# Patient Record
Sex: MALE | Race: WHITE | Employment: OTHER | URBAN - METROPOLITAN AREA
[De-identification: names, ages, dates, MRNs, and addresses within clinical notes are randomized per-mention and may not be internally consistent; named-entity substitution may affect disease eponyms.]

---

## 2018-07-22 ENCOUNTER — HOSPITAL ENCOUNTER (INPATIENT)
Facility: CLINIC | Age: 83
LOS: 2 days | Discharge: HOME | DRG: 689 | End: 2018-07-25
Attending: EMERGENCY MEDICINE | Admitting: INTERNAL MEDICINE
Payer: MEDICARE

## 2018-07-22 ENCOUNTER — APPOINTMENT (OUTPATIENT)
Dept: GENERAL RADIOLOGY | Facility: CLINIC | Age: 83
DRG: 689 | End: 2018-07-22
Attending: EMERGENCY MEDICINE
Payer: MEDICARE

## 2018-07-22 DIAGNOSIS — N39.0 URINARY TRACT INFECTION WITH HEMATURIA, SITE UNSPECIFIED: ICD-10-CM

## 2018-07-22 DIAGNOSIS — R31.9 URINARY TRACT INFECTION WITH HEMATURIA, SITE UNSPECIFIED: ICD-10-CM

## 2018-07-22 DIAGNOSIS — E86.0 DEHYDRATION: ICD-10-CM

## 2018-07-22 DIAGNOSIS — I48.0 PAROXYSMAL ATRIAL FIBRILLATION (H): Primary | ICD-10-CM

## 2018-07-22 PROBLEM — M62.81 GENERALIZED MUSCLE WEAKNESS: Status: ACTIVE | Noted: 2018-07-22

## 2018-07-22 PROBLEM — N17.0 ACUTE RENAL FAILURE WITH TUBULAR NECROSIS (H): Status: ACTIVE | Noted: 2018-07-22

## 2018-07-22 PROBLEM — N30.00 ACUTE CYSTITIS WITHOUT HEMATURIA: Status: ACTIVE | Noted: 2018-07-22

## 2018-07-22 PROBLEM — I10 BENIGN ESSENTIAL HYPERTENSION: Status: ACTIVE | Noted: 2018-07-22

## 2018-07-22 LAB
ALBUMIN SERPL-MCNC: 2.6 G/DL (ref 3.4–5)
ALBUMIN UR-MCNC: 100 MG/DL
ALP SERPL-CCNC: 79 U/L (ref 40–150)
ALT SERPL W P-5'-P-CCNC: 15 U/L (ref 0–70)
ANION GAP SERPL CALCULATED.3IONS-SCNC: 11 MMOL/L (ref 3–14)
APPEARANCE UR: ABNORMAL
AST SERPL W P-5'-P-CCNC: 14 U/L (ref 0–45)
BACTERIA #/AREA URNS HPF: ABNORMAL /HPF
BASOPHILS # BLD AUTO: 0 10E9/L (ref 0–0.2)
BASOPHILS NFR BLD AUTO: 0.1 %
BILIRUB SERPL-MCNC: 1.1 MG/DL (ref 0.2–1.3)
BILIRUB UR QL STRIP: NEGATIVE
BUN SERPL-MCNC: 31 MG/DL (ref 7–30)
CALCIUM SERPL-MCNC: 8.8 MG/DL (ref 8.5–10.1)
CHLORIDE SERPL-SCNC: 96 MMOL/L (ref 94–109)
CO2 SERPL-SCNC: 26 MMOL/L (ref 20–32)
COLOR UR AUTO: ABNORMAL
CREAT SERPL-MCNC: 1.53 MG/DL (ref 0.66–1.25)
DIFFERENTIAL METHOD BLD: ABNORMAL
EOSINOPHIL # BLD AUTO: 0 10E9/L (ref 0–0.7)
EOSINOPHIL NFR BLD AUTO: 0 %
ERYTHROCYTE [DISTWIDTH] IN BLOOD BY AUTOMATED COUNT: 13.7 % (ref 10–15)
GFR SERPL CREATININE-BSD FRML MDRD: 43 ML/MIN/1.7M2
GLUCOSE SERPL-MCNC: 221 MG/DL (ref 70–99)
GLUCOSE UR STRIP-MCNC: NEGATIVE MG/DL
HCT VFR BLD AUTO: 40.4 % (ref 40–53)
HGB BLD-MCNC: 13.9 G/DL (ref 13.3–17.7)
HGB UR QL STRIP: ABNORMAL
IMM GRANULOCYTES # BLD: 0.1 10E9/L (ref 0–0.4)
IMM GRANULOCYTES NFR BLD: 0.4 %
INTERPRETATION ECG - MUSE: NORMAL
KETONES UR STRIP-MCNC: 10 MG/DL
LACTATE BLD-SCNC: 1.4 MMOL/L (ref 0.7–2)
LEUKOCYTE ESTERASE UR QL STRIP: ABNORMAL
LYMPHOCYTES # BLD AUTO: 1 10E9/L (ref 0.8–5.3)
LYMPHOCYTES NFR BLD AUTO: 5.1 %
MCH RBC QN AUTO: 31 PG (ref 26.5–33)
MCHC RBC AUTO-ENTMCNC: 34.4 G/DL (ref 31.5–36.5)
MCV RBC AUTO: 90 FL (ref 78–100)
MONOCYTES # BLD AUTO: 1.1 10E9/L (ref 0–1.3)
MONOCYTES NFR BLD AUTO: 5.2 %
MUCOUS THREADS #/AREA URNS LPF: PRESENT /LPF
NEUTROPHILS # BLD AUTO: 18.1 10E9/L (ref 1.6–8.3)
NEUTROPHILS NFR BLD AUTO: 89.2 %
NITRATE UR QL: POSITIVE
NRBC # BLD AUTO: 0 10*3/UL
NRBC BLD AUTO-RTO: 0 /100
PH UR STRIP: 5.5 PH (ref 5–7)
PLATELET # BLD AUTO: 270 10E9/L (ref 150–450)
POTASSIUM SERPL-SCNC: 3.9 MMOL/L (ref 3.4–5.3)
PROT SERPL-MCNC: 7.7 G/DL (ref 6.8–8.8)
RBC # BLD AUTO: 4.49 10E12/L (ref 4.4–5.9)
RBC #/AREA URNS AUTO: 17 /HPF (ref 0–2)
SODIUM SERPL-SCNC: 133 MMOL/L (ref 133–144)
SOURCE: ABNORMAL
SP GR UR STRIP: 1.03 (ref 1–1.03)
TROPONIN I SERPL-MCNC: <0.015 UG/L (ref 0–0.04)
UROBILINOGEN UR STRIP-MCNC: 2 MG/DL (ref 0–2)
WBC # BLD AUTO: 20.3 10E9/L (ref 4–11)
WBC #/AREA URNS AUTO: >182 /HPF (ref 0–5)

## 2018-07-22 PROCEDURE — 99205 OFFICE O/P NEW HI 60 MIN: CPT | Mod: AI | Performed by: INTERNAL MEDICINE

## 2018-07-22 PROCEDURE — 84484 ASSAY OF TROPONIN QUANT: CPT | Performed by: EMERGENCY MEDICINE

## 2018-07-22 PROCEDURE — 87086 URINE CULTURE/COLONY COUNT: CPT | Performed by: EMERGENCY MEDICINE

## 2018-07-22 PROCEDURE — 87040 BLOOD CULTURE FOR BACTERIA: CPT | Performed by: EMERGENCY MEDICINE

## 2018-07-22 PROCEDURE — 99207 ZZC CDG-MDM COMPONENT: MEETS MODERATE - UP CODED: CPT | Performed by: INTERNAL MEDICINE

## 2018-07-22 PROCEDURE — 93005 ELECTROCARDIOGRAM TRACING: CPT

## 2018-07-22 PROCEDURE — 96361 HYDRATE IV INFUSION ADD-ON: CPT

## 2018-07-22 PROCEDURE — 99285 EMERGENCY DEPT VISIT HI MDM: CPT | Mod: 25

## 2018-07-22 PROCEDURE — 71046 X-RAY EXAM CHEST 2 VIEWS: CPT

## 2018-07-22 PROCEDURE — 25000128 H RX IP 250 OP 636: Performed by: EMERGENCY MEDICINE

## 2018-07-22 PROCEDURE — 81001 URINALYSIS AUTO W/SCOPE: CPT | Performed by: EMERGENCY MEDICINE

## 2018-07-22 PROCEDURE — 85025 COMPLETE CBC W/AUTO DIFF WBC: CPT | Performed by: EMERGENCY MEDICINE

## 2018-07-22 PROCEDURE — 85610 PROTHROMBIN TIME: CPT | Performed by: EMERGENCY MEDICINE

## 2018-07-22 PROCEDURE — 96365 THER/PROPH/DIAG IV INF INIT: CPT

## 2018-07-22 PROCEDURE — 80053 COMPREHEN METABOLIC PANEL: CPT | Performed by: EMERGENCY MEDICINE

## 2018-07-22 PROCEDURE — 83605 ASSAY OF LACTIC ACID: CPT | Performed by: EMERGENCY MEDICINE

## 2018-07-22 RX ORDER — SODIUM CHLORIDE 9 MG/ML
1000 INJECTION, SOLUTION INTRAVENOUS CONTINUOUS
Status: DISCONTINUED | OUTPATIENT
Start: 2018-07-22 | End: 2018-07-23 | Stop reason: DRUGHIGH

## 2018-07-22 RX ORDER — PIPERACILLIN SODIUM, TAZOBACTAM SODIUM 3; .375 G/15ML; G/15ML
3.38 INJECTION, POWDER, LYOPHILIZED, FOR SOLUTION INTRAVENOUS ONCE
Status: COMPLETED | OUTPATIENT
Start: 2018-07-22 | End: 2018-07-22

## 2018-07-22 RX ORDER — TAMSULOSIN HYDROCHLORIDE 0.4 MG/1
0.4 CAPSULE ORAL AT BEDTIME
COMMUNITY

## 2018-07-22 RX ORDER — LIDOCAINE 40 MG/G
CREAM TOPICAL DAILY PRN
COMMUNITY

## 2018-07-22 RX ADMIN — SODIUM CHLORIDE 1000 ML: 9 INJECTION, SOLUTION INTRAVENOUS at 19:48

## 2018-07-22 RX ADMIN — PIPERACILLIN SODIUM,TAZOBACTAM SODIUM 3.38 G: 3; .375 INJECTION, POWDER, FOR SOLUTION INTRAVENOUS at 22:53

## 2018-07-22 RX ADMIN — SODIUM CHLORIDE 1000 ML: 9 INJECTION, SOLUTION INTRAVENOUS at 20:46

## 2018-07-22 ASSESSMENT — ENCOUNTER SYMPTOMS
APPETITE CHANGE: 1
WEAKNESS: 1
FATIGUE: 1
FEVER: 0

## 2018-07-22 NOTE — IP AVS SNAPSHOT
"                  MRN:[id number]                      After Visit Summary   7/22/2018    Nitin Brooke    MRN: [id number]           Thank you!     Thank you for choosing [facility] for your care. Our goal is always to provide you with excellent care. Hearing back from our patients is one way we can continue to improve our services. Please take a few minutes to complete the written survey that you may receive in the mail after you visit with us. Thank you!        Patient Information     Date Of Birth          3/13/1927        Designated Caregiver       Most Recent Value    Caregiver    Will someone help with your care after discharge? yes [\"My son.\"]    Name of designated caregiver Mateus Brooke    Phone number of caregiver see contact info    Caregiver address see contact info      About your hospital stay     You were admitted on:  July 23, 2018 You last received care in the:  [address]    You were discharged on:  July 25, 2018        Reason for your hospital stay       You were admitted with dehydration and likely urinary tract infection.  You improved with IV fluids and antibiotics.                  Who to Call     For medical emergencies, please call 911.  For non-urgent questions about your medical care, please call your primary care provider or clinic, [phone]          Attending Provider     Provider Specialty    Daniel Tim MD Emergency Medicine    Cookie Funes MD Internal Medicine       Primary Care Provider Office Phone # Fax #    Shaun Shepherd [phone]      After Care Instructions     Activity       Your activity upon discharge: activity as tolerated            Diet       Follow this diet upon discharge: Orders Placed This Encounter      Combination Diet Low Saturated Fat Na <2400mg Diet, No Caffeine Diet                  Follow-up Appointments     Follow-up and recommended labs and tests        Follow up with PCP for INR check in 2-3 days    You " "are scheduled for an INR check on  at the [facility] at 1:15 p.m.  Please call them at [phone] if you have to cancel or reschedule                  Future tests that were ordered for you     INR                 Pending Results     Date and Time Order Name Status Description    2018 0040 Blood culture Preliminary     2018 0040 Blood culture Preliminary     2018 2222 Blood culture Preliminary     2018 2222 Blood culture Preliminary             Statement of Approval     Ordered          18 0757  I have reviewed and agree with all the recommendations and orders detailed in this document.  EFFECTIVE NOW     Approved and electronically signed by:  Daniel Smith DO             Admission Information     Date & Time Provider Department Dept. Phone    2018 Cookie Funes MD [place] [address] Oncology [phone]      Your Vitals Were     Blood Pressure Pulse Temperature Respirations Height Weight    115/62 (BP Location: Left arm) 77 98.6  F (37  C) (Oral) 16 1.727 m (5' 8\") 82.6 kg (182 lb)    Pulse Oximetry BMI (Body Mass Index)                98% 27.67 kg/m2          MyChart Information     [facility] lets you send messages to your doctor, view your test results, renew your prescriptions, schedule appointments and more. To sign up, go to [email or web address] . Click on \"Log in\" on the left side of the screen, which will take you to the Welcome page. Then click on \"Sign up Now\" on the right side of the page.     You will be asked to enter the access code listed below, as well as some personal information. Please follow the directions to create your username and password.     Your access code is: [email or web address]  Expires: 10/23/2018  7:58 AM     Your access code will  in 90 days. If you need help or a new code, please call your [facility] or [phone].        Care EveryWhere ID     This is your Care EveryWhere ID. This could be used by " other organizations to access your [facility] medical records  [id number]        Equal Access to Services     FRANTZ ESCOBAR : Tonja Flower, jessica trent, david harrismaall noriega, otoniel prajapatilisasoniya grant. So [facility] [phone].    ATENCIÓN: Si habla español, tiene a wallace disposición servicios gratuitos de asistencia lingüística. Llame al [phone].    We comply with applicable federal civil rights laws and [place] laws. We do not discriminate on the basis of race, color, national origin, age, disability, sex, sexual orientation, or gender identity.               Review of your medicines      START taking        Dose / Directions    cefuroxime 250 MG tablet   Commonly known as:  CEFTIN   Used for:  Urinary tract infection with hematuria, site unspecified        Dose:  250 mg   Take 1 tablet (250 mg) by mouth 2 times daily for 5 days   Quantity:  10 tablet   Refills:  0         CONTINUE these medicines which have NOT CHANGED        Dose / Directions    ACETAMINOPHEN PO        Dose:  500 mg   Take 500 mg by mouth daily as needed for pain   Refills:  0       diltiazem 120 MG 24 hr ER beaded capsule   Commonly known as:  TIAZAC        Dose:  120 mg   Take 120 mg by mouth daily   Refills:  0       diphenhydrAMINE-acetaminophen  MG tablet   Commonly known as:  TYLENOL PM        Dose:  1 tablet   Take 1 tablet by mouth At Bedtime   Refills:  0       FLOMAX 0.4 MG capsule   Generic drug:  tamsulosin        Dose:  0.4 mg   Take 0.4 mg by mouth At Bedtime   Refills:  0       lidocaine 4 % Crea cream   Commonly known as:  LMX4        Apply topically daily as needed for mild pain   Refills:  0       METOPROLOL TARTRATE PO        Dose:  75 mg   Take 75 mg by mouth 2 times daily Patient takes 1.5 of 50mg tablet twice daily.   Refills:  0       multivitamin, therapeutic Tabs tablet        Dose:  1 tablet   Take 1 tablet by mouth daily   Refills:  0       * WARFARIN SODIUM PO         Dose:  5 mg   Take 5 mg by mouth twice a week Patient takes 5mg on M/F and 2.5mg on the rest of the days of the week   Refills:  0       * WARFARIN SODIUM PO        Dose:  2.5 mg   Take 2.5 mg by mouth five times a week Patient takes 2.5mg on Tu/Wed/Th/Sa/Sun and 5mg on M/F.   Refills:  0       * Notice:  This list has 2 medication(s) that are the same as other medications prescribed for you. Read the directions carefully, and ask your doctor or other care provider to review them with you.      STOP taking     HYDROCHLOROTHIAZIDE PO                Where to get your medicines      These medications were sent to [facility] [place], [place] - [address]  [address], [place] [address]     Phone:  [phone]     cefuroxime 250 MG tablet                Protect others around you: Learn how to safely use, store and throw away your medicines at www.disposemymeds.org.        ANTIBIOTIC INSTRUCTION     You've Been Prescribed an Antibiotic - Now What?  Your healthcare team thinks that you or your loved one might have an infection. Some infections can be treated with antibiotics, which are powerful, life-saving drugs. Like all medications, antibiotics have side effects and should only be used when necessary. There are some important things you should know about your antibiotic treatment.      Your healthcare team may run tests before you start taking an antibiotic.    Your team may take samples (e.g., from your blood, urine or other areas) to run tests to look for bacteria. These test can be important to determine if you need an antibiotic at all and, if you do, which antibiotic will work best.      Within a few days, your healthcare team might change or even stop your antibiotic.    Your team may start you on an antibiotic while they are working to find out what is making you sick.    Your team might change your antibiotic because test results show that a different antibiotic would be better to  treat your infection.    In some cases, once your team has more information, they learn that you do not need an antibiotic at all. They may find out that you don't have an infection, or that the antibiotic you're taking won't work against your infection. For example, an infection caused by a virus can't be treated with antibiotics. Staying on an antibiotic when you don't need it is more likely to be harmful than helpful.      You may experience side effects from your antibiotic.    Like all medications, antibiotics have side effects. Some of these can be serious.    Let you healthcare team know if you have any known allergies when you are admitted to the hospital.    One significant side effect of nearly all antibiotics is the risk of severe and sometimes deadly diarrhea caused by Clostridium difficile (C. Difficile). This occurs when a person takes antibiotics because some good germs are destroyed. Antibiotic use allows C. diificile to take over, putting patients at high risk for this serious infection.    As a patient or caregiver, it is important to understand your or your loved one's antibiotic treatment. It is especially important for caregivers to speak up when patients can't speak for themselves. Here are some important questions to ask your healthcare team.    What infection is this antibiotic treating and how do you know I have that infection?    What side effects might occur from this antibiotic?    How long will I need to take this antibiotic?    Is it safe to take this antibiotic with other medications or supplements (e.g., vitamins) that I am taking?     Are there any special directions I need to know about taking this antibiotic? For example, should I take it with food?    How will I be monitored to know whether my infection is responding to the antibiotic?    What tests may help to make sure the right antibiotic is prescribed for me?      Information provided by:  www.cdc.gov/getsmart  U.S. Department  of Health and Human Services  Centers for disease Control and Prevention  National Center for Emerging and Zoonotic Infectious Diseases  Division of Healthcare Quality Promotion             Medication List: This is a list of all your medications and when to take them. Check marks below indicate your daily home schedule. Keep this list as a reference.      Medications           Morning Afternoon Evening Bedtime As Needed    ACETAMINOPHEN PO   Take 500 mg by mouth daily as needed for pain                                cefuroxime 250 MG tablet   Commonly known as:  CEFTIN   Take 1 tablet (250 mg) by mouth 2 times daily for 5 days                                diltiazem 120 MG 24 hr ER beaded capsule   Commonly known as:  TIAZAC   Take 120 mg by mouth daily                                diphenhydrAMINE-acetaminophen  MG tablet   Commonly known as:  TYLENOL PM   Take 1 tablet by mouth At Bedtime                                FLOMAX 0.4 MG capsule   Take 0.4 mg by mouth At Bedtime   Last time this was given:  0.4 mg on 7/24/2018  9:28 PM   Generic drug:  tamsulosin                                lidocaine 4 % Crea cream   Commonly known as:  LMX4   Apply topically daily as needed for mild pain                                METOPROLOL TARTRATE PO   Take 75 mg by mouth 2 times daily Patient takes 1.5 of 50mg tablet twice daily.   Last time this was given:  75 mg on 7/25/2018  8:11 AM                                multivitamin, therapeutic Tabs tablet   Take 1 tablet by mouth daily   Last time this was given:  1 tablet on 7/25/2018  8:11 AM                                * WARFARIN SODIUM PO   Take 5 mg by mouth twice a week Patient takes 5mg on M/F and 2.5mg on the rest of the days of the week   Last time this was given:  2.5 mg on 7/24/2018  5:06 PM                                * WARFARIN SODIUM PO   Take 2.5 mg by mouth five times a week Patient takes 2.5mg on Tu/Wed/Th/Sa/Sun and 5mg on M/F.   Last time  this was given:  2.5 mg on 7/24/2018  5:06 PM                                * Notice:  This list has 2 medication(s) that are the same as other medications prescribed for you. Read the directions carefully, and ask your doctor or other care provider to review them with you.

## 2018-07-22 NOTE — IP AVS SNAPSHOT
[address], [address]    [facility] [address]    Phone:  [phone]                                       After Visit Summary   7/22/2018    Nitin Brooke    MRN: [id number]           After Visit Summary Signature Page     I have received my discharge instructions, and my questions have been answered. I have discussed any challenges I see with this plan with the nurse or doctor.    ..........................................................................................................................................  Patient/Patient Representative Signature      ..........................................................................................................................................  Patient Representative Print Name and Relationship to Patient    ..................................................               ................................................  Date                                            Time    ..........................................................................................................................................  Reviewed by Signature/Title    ...................................................              ..............................................  Date                                                            Time

## 2018-07-23 PROBLEM — N17.9 ACUTE RENAL FAILURE (ARF) (H): Status: ACTIVE | Noted: 2018-07-23

## 2018-07-23 LAB
HBA1C MFR BLD: 6.1 % (ref 0–5.6)
INR PPP: 5.2 (ref 0.86–1.14)
INR PPP: 5.91 (ref 0.86–1.14)
LACTATE BLD-SCNC: 1.5 MMOL/L (ref 0.4–1.9)

## 2018-07-23 PROCEDURE — 12000000 ZZH R&B MED SURG/OB

## 2018-07-23 PROCEDURE — A9270 NON-COVERED ITEM OR SERVICE: HCPCS | Mod: GY | Performed by: INTERNAL MEDICINE

## 2018-07-23 PROCEDURE — 85610 PROTHROMBIN TIME: CPT | Performed by: INTERNAL MEDICINE

## 2018-07-23 PROCEDURE — A9270 NON-COVERED ITEM OR SERVICE: HCPCS | Performed by: INTERNAL MEDICINE

## 2018-07-23 PROCEDURE — 99232 SBSQ HOSP IP/OBS MODERATE 35: CPT | Performed by: INTERNAL MEDICINE

## 2018-07-23 PROCEDURE — 87040 BLOOD CULTURE FOR BACTERIA: CPT | Performed by: INTERNAL MEDICINE

## 2018-07-23 PROCEDURE — 25000128 H RX IP 250 OP 636: Performed by: INTERNAL MEDICINE

## 2018-07-23 PROCEDURE — 83605 ASSAY OF LACTIC ACID: CPT | Performed by: INTERNAL MEDICINE

## 2018-07-23 PROCEDURE — 25000128 H RX IP 250 OP 636: Performed by: EMERGENCY MEDICINE

## 2018-07-23 PROCEDURE — 25000125 ZZHC RX 250: Performed by: INTERNAL MEDICINE

## 2018-07-23 PROCEDURE — 83036 HEMOGLOBIN GLYCOSYLATED A1C: CPT | Performed by: INTERNAL MEDICINE

## 2018-07-23 PROCEDURE — 36415 COLL VENOUS BLD VENIPUNCTURE: CPT | Performed by: INTERNAL MEDICINE

## 2018-07-23 PROCEDURE — 25000132 ZZH RX MED GY IP 250 OP 250 PS 637: Mod: GY | Performed by: INTERNAL MEDICINE

## 2018-07-23 RX ORDER — SODIUM CHLORIDE 9 MG/ML
INJECTION, SOLUTION INTRAVENOUS CONTINUOUS
Status: DISCONTINUED | OUTPATIENT
Start: 2018-07-23 | End: 2018-07-24

## 2018-07-23 RX ORDER — FAMOTIDINE 20 MG/1
20 TABLET, FILM COATED ORAL DAILY
Status: DISCONTINUED | OUTPATIENT
Start: 2018-07-23 | End: 2018-07-25 | Stop reason: HOSPADM

## 2018-07-23 RX ORDER — AMOXICILLIN 250 MG
1 CAPSULE ORAL 2 TIMES DAILY PRN
Status: DISCONTINUED | OUTPATIENT
Start: 2018-07-23 | End: 2018-07-25 | Stop reason: HOSPADM

## 2018-07-23 RX ORDER — POLYETHYLENE GLYCOL 3350 17 G/17G
17 POWDER, FOR SOLUTION ORAL DAILY PRN
Status: DISCONTINUED | OUTPATIENT
Start: 2018-07-23 | End: 2018-07-25 | Stop reason: HOSPADM

## 2018-07-23 RX ORDER — CEFTRIAXONE 1 G/1
1 INJECTION, POWDER, FOR SOLUTION INTRAMUSCULAR; INTRAVENOUS EVERY 24 HOURS
Status: DISCONTINUED | OUTPATIENT
Start: 2018-07-23 | End: 2018-07-25 | Stop reason: HOSPADM

## 2018-07-23 RX ORDER — NALOXONE HYDROCHLORIDE 0.4 MG/ML
.1-.4 INJECTION, SOLUTION INTRAMUSCULAR; INTRAVENOUS; SUBCUTANEOUS
Status: DISCONTINUED | OUTPATIENT
Start: 2018-07-23 | End: 2018-07-25 | Stop reason: HOSPADM

## 2018-07-23 RX ORDER — AMOXICILLIN 250 MG
2 CAPSULE ORAL 2 TIMES DAILY PRN
Status: DISCONTINUED | OUTPATIENT
Start: 2018-07-23 | End: 2018-07-25 | Stop reason: HOSPADM

## 2018-07-23 RX ORDER — LIDOCAINE 40 MG/G
CREAM TOPICAL
Status: DISCONTINUED | OUTPATIENT
Start: 2018-07-23 | End: 2018-07-25 | Stop reason: HOSPADM

## 2018-07-23 RX ORDER — DILTIAZEM HYDROCHLORIDE 120 MG/1
120 CAPSULE, EXTENDED RELEASE ORAL DAILY
Status: DISCONTINUED | OUTPATIENT
Start: 2018-07-23 | End: 2018-07-25 | Stop reason: HOSPADM

## 2018-07-23 RX ORDER — ACETAMINOPHEN 325 MG/1
650 TABLET ORAL EVERY 4 HOURS PRN
Status: DISCONTINUED | OUTPATIENT
Start: 2018-07-23 | End: 2018-07-25 | Stop reason: HOSPADM

## 2018-07-23 RX ORDER — FAMOTIDINE 20 MG/1
20 TABLET, FILM COATED ORAL 2 TIMES DAILY
Status: DISCONTINUED | OUTPATIENT
Start: 2018-07-23 | End: 2018-07-23

## 2018-07-23 RX ORDER — ONDANSETRON 2 MG/ML
4 INJECTION INTRAMUSCULAR; INTRAVENOUS EVERY 6 HOURS PRN
Status: DISCONTINUED | OUTPATIENT
Start: 2018-07-23 | End: 2018-07-25 | Stop reason: HOSPADM

## 2018-07-23 RX ORDER — DOCUSATE SODIUM 100 MG/1
100 CAPSULE, LIQUID FILLED ORAL 2 TIMES DAILY
Status: DISCONTINUED | OUTPATIENT
Start: 2018-07-23 | End: 2018-07-25 | Stop reason: HOSPADM

## 2018-07-23 RX ORDER — MULTIVITAMIN,THERAPEUTIC
1 TABLET ORAL DAILY
Status: DISCONTINUED | OUTPATIENT
Start: 2018-07-23 | End: 2018-07-25 | Stop reason: HOSPADM

## 2018-07-23 RX ORDER — TAMSULOSIN HYDROCHLORIDE 0.4 MG/1
0.4 CAPSULE ORAL AT BEDTIME
Status: DISCONTINUED | OUTPATIENT
Start: 2018-07-23 | End: 2018-07-25 | Stop reason: HOSPADM

## 2018-07-23 RX ORDER — ONDANSETRON 4 MG/1
4 TABLET, ORALLY DISINTEGRATING ORAL EVERY 6 HOURS PRN
Status: DISCONTINUED | OUTPATIENT
Start: 2018-07-23 | End: 2018-07-25 | Stop reason: HOSPADM

## 2018-07-23 RX ORDER — HYDROMORPHONE HYDROCHLORIDE 1 MG/ML
0.2 INJECTION, SOLUTION INTRAMUSCULAR; INTRAVENOUS; SUBCUTANEOUS
Status: DISCONTINUED | OUTPATIENT
Start: 2018-07-23 | End: 2018-07-25 | Stop reason: HOSPADM

## 2018-07-23 RX ORDER — HYDROCODONE BITARTRATE AND ACETAMINOPHEN 5; 325 MG/1; MG/1
1-2 TABLET ORAL EVERY 4 HOURS PRN
Status: DISCONTINUED | OUTPATIENT
Start: 2018-07-23 | End: 2018-07-25 | Stop reason: HOSPADM

## 2018-07-23 RX ADMIN — SODIUM CHLORIDE: 9 INJECTION, SOLUTION INTRAVENOUS at 12:27

## 2018-07-23 RX ADMIN — FAMOTIDINE 20 MG: 20 TABLET, FILM COATED ORAL at 08:25

## 2018-07-23 RX ADMIN — TAMSULOSIN HYDROCHLORIDE 0.4 MG: 0.4 CAPSULE ORAL at 21:04

## 2018-07-23 RX ADMIN — TAMSULOSIN HYDROCHLORIDE 0.4 MG: 0.4 CAPSULE ORAL at 01:09

## 2018-07-23 RX ADMIN — METOPROLOL TARTRATE 75 MG: 50 TABLET ORAL at 08:24

## 2018-07-23 RX ADMIN — SODIUM CHLORIDE 1000 ML: 9 INJECTION, SOLUTION INTRAVENOUS at 00:32

## 2018-07-23 RX ADMIN — DILTIAZEM HYDROCHLORIDE 120 MG: 120 CAPSULE, EXTENDED RELEASE ORAL at 08:25

## 2018-07-23 RX ADMIN — THERA TABS 1 TABLET: TAB at 08:25

## 2018-07-23 RX ADMIN — PHYTONADIONE 2 MG: 10 INJECTION, EMULSION INTRAMUSCULAR; INTRAVENOUS; SUBCUTANEOUS at 09:30

## 2018-07-23 RX ADMIN — CEFTRIAXONE SODIUM 1 G: 1 INJECTION, POWDER, FOR SOLUTION INTRAMUSCULAR; INTRAVENOUS at 01:09

## 2018-07-23 RX ADMIN — SODIUM CHLORIDE: 9 INJECTION, SOLUTION INTRAVENOUS at 01:09

## 2018-07-23 RX ADMIN — METOPROLOL TARTRATE 75 MG: 50 TABLET ORAL at 21:01

## 2018-07-23 ASSESSMENT — ACTIVITIES OF DAILY LIVING (ADL)
ADLS_ACUITY_SCORE: 14
ADLS_ACUITY_SCORE: 14
ADLS_ACUITY_SCORE: 20
ADLS_ACUITY_SCORE: 14
ADLS_ACUITY_SCORE: 20

## 2018-07-23 NOTE — ED PROVIDER NOTES
"  History     Chief Complaint:  Generalized weakness    HPI   Nitin Brooke is a 89 year old male with a history of recurrent bladder infections and a-fib who presents with general weakness. The patient states that for the past 2 weeks he has felt weak and fatigued. He states the he feels that he may be dehydrated. The patient denies any falls or fevers. He is on Warfarin.      Allergies:  No known allergies     Medications:    Acetaminophen  Omnicef  Tiazac  Tylenol  Hydrochlorothiazide  Metoprolol  Warfarin     Past Medical History:    Atrial fibrillation    Past Surgical History:    Bilateral hip replacement    Family History:    No past pertinent family history.    Social History:  Patient presents with son  Former smoker  Positive for alcohol use.   Marital Status:       Review of Systems   Constitutional: Positive for appetite change and fatigue. Negative for fever.   Neurological: Positive for weakness.   All other systems reviewed and are negative.      Physical Exam     Patient Vitals for the past 24 hrs:   BP Temp Temp src Heart Rate Resp SpO2 Height Weight   07/22/18 2100 100/74 - - - - 97 % - -   07/22/18 2000 112/62 - - 77 - 96 % - -   07/22/18 1912 108/63 - - - - 97 % - -   07/22/18 1813 98/58 98.3  F (36.8  C) Oral 83 18 96 % 1.727 m (5' 8\") 82.6 kg (182 lb)         Physical Exam    General: Alert and Interactive.   Head: No signs of trauma.   Mouth/Throat: Oropharynx is clear. Extremely dry mucous membranes   Eyes: Conjunctivae are normal. Pupils are equal, round, and reactive to light.   Neck: Normal range of motion. No nuchal rigidity.   CV: Normal rate and irregular rhythm.    Resp: Effort normal and breath sounds normal. No respiratory distress.   GI: Soft. There is no tenderness or guarding.   MSK: Normal range of motion. no edema.   Neuro: The patient is alert and oriented to person, place, and time.  PERRLA, EOMI, strength in upper/lower extremities normal and symmetrical.   Sensation " normal. Speech normal.  GCS eye subscore is 4. GCS verbal subscore is 5. GCS motor subscore is 6.   Skin: Skin is warm and dry. No rash noted.   Psych: normal mood and affect. behavior is normal.       Emergency Department Course   ECG:  Time: 1919  Vent. Rate 84 bpm. WY interval *. QRS duration 82. QT/QTc 370/437. P-R-T axis * 71 66. Atrial fibrillation. Nonspecific ST abnormality. Abnormal ECG. No significant change compared to EKG dated 2/24/2011 Read time: 1925      Imaging:  Radiographic findings were communicated with the patient who voiced understanding of the findings.  X-ray Chest, 2 views:  no active disease  Result per radiology.      Laboratory:  CBC: WBC: 20.3 (H), HGB: 13.9, PLT: 270  CMP: Glucose 221 (H), Bun: 31 (H), Creatinine: 1.53 (H), GFR; 43 (L), Albumin: 2.6 (L) o/w WNL   Troponin: <0.015  Urine Culture: pending  UA with micro: Blood: Moderate, Ketone: 10, Protein albumin: 100, Nitrite: Positive, Leukocyte esterase: Large. RBC: 17 (H). WBC: >182. Bacteria: Many. Mucous: present o/w negative  Lactic acid: 1.4  Blood culture: pending    Interventions:  1930 - NS 1L IV  2046 - NS 1L IV  2253 - Zosyn 3.375 g IV      Emergency Department Course:  Nursing notes and vitals reviewed.  1930: I performed an exam of the patient as documented above. IV inserted and blood drawn.  Labs and imaging ordered    2223: Findings and plan explained to the Patient who consents to admission.     2315: Discussed the patient with Dr. Funes, who will admit the patient to a medical surgery bed for further monitoring, evaluation, and treatment.     Impression & Plan      Medical Decision Making:  Nitin Brooke is a 89 year old male who presents to the ER with weakness as documented above. The patient is extremely dehydrated. He received 2 liters of normal saline and was able to finally give us urine for testing. The patient s white count is significantly elevated with no fever. He does evidence of acute renal failure as  well, but this is likely all pre-renal secondary to dehydration. Patient will require IV antibiotics, further treatment, and close monitoring overnight. Patient is not septic with a normal lactic acid.     Diagnosis:    ICD-10-CM    1. Urinary tract infection with hematuria, site unspecified N39.0     R31.9    2. Dehydration E86.0        Andrei HINOJOSA, am serving as a scribe on 7/22/2018 at 7:28 PM to personally document services performed by Daniel Tim MD based on my observations and the provider's statements to me.       Andrei Schultz  7/22/2018    EMERGENCY DEPARTMENT       Daniel Tim MD  07/23/18 0001

## 2018-07-23 NOTE — ED NOTES
[facility]  ED Nurse Handoff Report    ED Chief complaint: Generalized Weakness (Feeling weak and tired for the past 2 weeks. Hx of recurrent bladder infections and a-fib. Poor appetite ad poor fluid intake. SOB with walking)      ED Diagnosis:   Final diagnoses:   Urinary tract infection with hematuria, site unspecified   Dehydration       Code Status: DNR / DNI    Allergies: No Known Allergies    Activity level - Baseline/Home:  Independent with cane bu reports he is going to start using a walker.    Activity Level - Current:   Stand with Assist d/t feeling weak     Needed?: No    Isolation: No  Infection: Not Applicable  Bariatric?: No    Vital Signs:   Vitals:    07/22/18 2200 07/22/18 2215 07/22/18 2230 07/22/18 2258   BP: 98/73  101/60 99/65   Resp: 29 15 25 (!) 61   Temp:       TempSrc:       SpO2: 97% 97% 97% 97%   Weight:       Height:           Cardiac Rhythm: ,        Pain level:      Is this patient confused?: No   Guy - Suicide Severity Rating Scale Completed?  Yes  If yes, what color did the patient score?  White    Patient Report: Initial Complaint: Pt presents with generalized weakness over last couple of weeks and feeling dizzy when standing.  Focused Assessment: Pt with dry mucous membranes. In a-fib. Very pleasant.   Tests Performed: labs and chest x-ray; lactic 1.4  Abnormal Results: WBC 20.3; UA shows UTI  Treatments provided: 2L NS; zosyn    Family Comments: Son Mateus present and supportive.    OBS brochure/video discussed/provided to patient: No    ED Medications:   Medications   0.9% sodium chloride BOLUS (0 mLs Intravenous Stopped 7/22/18 2217)     Followed by   sodium chloride 0.9% infusion (not administered)   piperacillin-tazobactam (ZOSYN) 3.375 g vial to attach to  mL bag (3.375 g Intravenous New Bag 7/22/18 2253)   0.9% sodium chloride BOLUS (0 mLs Intravenous Stopped 7/22/18 2044)       Drips infusing?:  No    For the majority of the shift this  patient was Green.   Interventions performed were .    Severe Sepsis OR Septic Shock Diagnosis Present: No      ED NURSE PHONE NUMBER: [phone]

## 2018-07-23 NOTE — H&P
[facility]    History and Physical  Hospitalist       Date of Admission:  7/22/2018    Assessment & Plan   Nitin Brooke is a 89 year old male with history of hypertension on hydrochlorothiazide, A. fib  on warfarin admitted to emergency department for increased generalized weakness and was diagnosed with the UTI and acute renal failure.  Active Problems:    Generalized muscle weakness    Acute cystitis without hematuria    Weakness secondary to possible UTI and renal failure    Admit to inpatient, IV fluids started    Patient has prior history of Klebsiella UTI which was sensitive to cephalosporins, will start him on Rocephin, received 1 dose of Zosyn from the emergency department.    Follow urine and blood cultures.    We will get PT/OT evaluate patient and get social work consult.    Repeat labs in a.m.    Acute renal failure with tubular necrosis (H)    Renal failure multifactorial secondary to being on hydrochlorothiazide, reduced oral intake, UTI and possible hypotension    Continue IV hydration with normal saline    BMP in a.m.    Benign essential hypertension    Paroxysmal atrial fibrillation (H)    Continue metoprolol and diltiazem, hold HCTZ    Patient is on warfarin, pharmacy to dose.  BPH    Continue Flomax.    # Pain Assessment:  Current Pain Score 7/22/2018   Pain score (0-10) 0   Pain location -   Pain descriptors -   - Nitin is not experiencing pain . Pain management was discussed and the plan was created in a collaborative fashion.  Nitin's response to the current recommendations: engaged  - Please see the plan for pain management as documented above        DVT Prophylaxis: Warfarin  Code Status: Full Code    Disposition: Expected discharge in 2 days    Cookie Funes MD    Primary Care Physician   Shaun Shepherd    Chief Complaint   Weakness 1 week     History is obtained from the patient    History of Present Illness   Nitin Brooke is a 89 year old male with a history of  BILLY. fib on warfarin, hypertension on hydrochlorothiazide presents to the emergency department with two-week history of increased progressive weakness.  He had left-sided flank pain 10 days ago and he mentions that his urine was dark and then it got cleared.  He did not receive any antibiotics during this time.  Over past 1 week his weakness had progressively gotten worse and that it was difficult for him to be at home alone.  Patient is a poor historian due to his significant difficulty hearing, has bilateral hearing loss.  He felt that he was dehydrated a few days ago and he was trying to hydrate himself, denied any fever chills or abdominal pain.  He had dysuria for a short while but denies any that now.    In the emergency department he was found to have a creatinine of 1.53, his baseline is 1.  The elevated white count.  LFTs and lactate are within normal limits.  His UA was significantly abnormal cultures pending.  Had a normal chest x-ray.    Past Medical History    I have reviewed this patient's medical history and updated it with pertinent information if needed.   Past Medical History:   Diagnosis Date     Atrial fibrillation (H)        Past Surgical History   I have reviewed this patient's surgical history and updated it with pertinent information if needed.  Past Surgical History:   Procedure Laterality Date     ORTHOPEDIC SURGERY  2002, 2008    bilat hip replacement       Prior to Admission Medications   Prior to Admission Medications   Prescriptions Last Dose Informant Patient Reported? Taking?   ACETAMINOPHEN PO Past Month at Unknown time Self Yes Yes   Sig: Take 500 mg by mouth daily as needed for pain    HYDROCHLOROTHIAZIDE PO 7/22/2018 at am Pharmacy Yes Yes   Sig: Take 12.5 mg by mouth daily Patient takes 1/2 of 25mg tablet daily   METOPROLOL TARTRATE PO 7/22/2018 at am Pharmacy Yes Yes   Sig: Take 75 mg by mouth 2 times daily Patient takes 1.5 of 50mg tablet twice daily.   WARFARIN SODIUM PO  7/20/2018 Self Yes Yes   Sig: Take 5 mg by mouth twice a week Patient takes 5mg on M/F and 2.5mg on the rest of the days of the week   WARFARIN SODIUM PO 7/21/2018 at hs Pharmacy Yes Yes   Sig: Take 2.5 mg by mouth five times a week Patient takes 2.5mg on Tu/Wed/Th/Sa/Sun and 5mg on M/F.   diltiazem (TIAZAC) 120 MG CP24 7/22/2018 at am Pharmacy Yes Yes   Sig: Take 120 mg by mouth daily   diphenhydrAMINE-acetaminophen (TYLENOL PM)  MG tablet 7/21/2018 at hs  Yes Yes   Sig: Take 1 tablet by mouth At Bedtime   lidocaine (LMX4) 4 % CREA cream prn  Yes Yes   Sig: Apply topically daily as needed for mild pain   multivitamin, therapeutic (THERA-VIT) TABS 7/22/2018 at am Self Yes Yes   Sig: Take 1 tablet by mouth daily    tamsulosin (FLOMAX) 0.4 MG capsule 7/21/2018 at hs  Yes Yes   Sig: Take 0.4 mg by mouth At Bedtime      Facility-Administered Medications: None     Allergies   No Known Allergies    Social History   I have reviewed this patient's social history and updated it with pertinent information if needed. Nitin Brooke  reports that he has quit smoking. He does not have any smokeless tobacco history on file. He reports that he drinks about 2.0 oz of alcohol per week     Family History   I have reviewed this patient's family history and updated it with pertinent information if needed.   No family history on file.    Review of Systems   The 10 point Review of Systems is negative other than noted in the HPI .    Physical Exam   Temp: 98.3  F (36.8  C) Temp src: Oral BP: 114/63   Heart Rate: 69 Resp: 27 SpO2: 97 % O2 Device: None (Room air)    Vital Signs with Ranges  Temp:  [98.3  F (36.8  C)] 98.3  F (36.8  C)  Heart Rate:  [63-84] 69  Resp:  [15-61] 27  BP: ()/(58-74) 114/63  SpO2:  [96 %-97 %] 97 %  182 lbs 0 oz    Constitutional: Awake, alert, cooperative, no apparent distress.  Eyes: Conjunctiva and pupils examined and normal.  HEENT: Moist mucous membranes, normal dentition.  Respiratory: Clear to  auscultation bilaterally, no crackles or wheezing.  Cardiovascular: Regular rate and rhythm, normal S1 and S2, and no murmur noted.  GI: Soft, non-distended, non-tender, normal bowel sounds.  Lymph/Hematologic: No anterior cervical or supraclavicular adenopathy.  Skin: No rashes, no cyanosis, no edema.  Musculoskeletal: No joint swelling, erythema or tenderness.  Neurologic: Cranial nerves 2-12 intact, normal strength and sensation.  Psychiatric: Alert, oriented to person, place and time, no obvious anxiety or depression.    Data   Data reviewed today:  I personally reviewed chest x ray     Recent Labs  Lab 07/22/18  1948   WBC 20.3*   HGB 13.9   MCV 90         POTASSIUM 3.9   CHLORIDE 96   CO2 26   BUN 31*   CR 1.53*   ANIONGAP 11   OSWALD 8.8   *   ALBUMIN 2.6*   PROTTOTAL 7.7   BILITOTAL 1.1   ALKPHOS 79   ALT 15   AST 14   TROPI <0.015       Imaging:  Recent Results (from the past 24 hour(s))   XR Chest 2 Views    Narrative    CHEST TWO VIEWS  7/22/2018 8:37 PM     HISTORY: Chest pain. Shortness of breath.    COMPARISON: 2/23/2011.      Impression    IMPRESSION: No active disease     JOSEMANUEL BUCHANAN MD

## 2018-07-23 NOTE — PLAN OF CARE
RECEIVING UNIT ED HANDOFF REVIEW    ED Nurse Handoff Report was reviewed by: Cathy Condon on July 22, 2018 at 11:35 PM

## 2018-07-23 NOTE — PHARMACY-ADMISSION MEDICATION HISTORY
Admission medication history interview status for the 7/22/2018  admission is complete. See EPIC admission navigator for prior to admission medications     Medication history source reliability:Good, pt interview    Actions taken by pharmacist (provider contacted, etc):added Flomax, Lidocaine cream     Additional medication history information not noted on PTA med list :None    Medication reconciliation/reorder completed by provider prior to medication history? No    Time spent in this activity: 15 minutes    Prior to Admission medications    Medication Sig Last Dose Taking? Auth Provider   ACETAMINOPHEN PO Take 500 mg by mouth daily as needed for pain  Past Month at Unknown time Yes Unknown, Entered By History   diltiazem (TIAZAC) 120 MG CP24 Take 120 mg by mouth daily 7/22/2018 at am Yes Unknown, Entered By History   diphenhydrAMINE-acetaminophen (TYLENOL PM)  MG tablet Take 1 tablet by mouth At Bedtime 7/21/2018 at hs Yes Unknown, Entered By History   HYDROCHLOROTHIAZIDE PO Take 12.5 mg by mouth daily Patient takes 1/2 of 25mg tablet daily 7/22/2018 at am Yes Unknown, Entered By History   lidocaine (LMX4) 4 % CREA cream Apply topically daily as needed for mild pain prn Yes Unknown, Entered By History   METOPROLOL TARTRATE PO Take 75 mg by mouth 2 times daily Patient takes 1.5 of 50mg tablet twice daily. 7/22/2018 at am Yes Unknown, Entered By History   multivitamin, therapeutic (THERA-VIT) TABS Take 1 tablet by mouth daily  7/22/2018 at am Yes Unknown, Entered By History   tamsulosin (FLOMAX) 0.4 MG capsule Take 0.4 mg by mouth At Bedtime 7/21/2018 at hs Yes Unknown, Entered By History   WARFARIN SODIUM PO Take 5 mg by mouth twice a week Patient takes 5mg on M/F and 2.5mg on the rest of the days of the week 7/20/2018 Yes Unknown, Entered By History   WARFARIN SODIUM PO Take 2.5 mg by mouth five times a week Patient takes 2.5mg on Tu/Wed/Th/Sa/Sun and 5mg on M/F. 7/21/2018 at hs Yes Unknown, Entered By  History

## 2018-07-23 NOTE — PROGRESS NOTES
[facility]    Hospitalist Progress Note    Assessment & Plan   Nitin Brooke is a 89 year old male with history of hypertension on hydrochlorothiazide, A. fib  on warfarin admitted to emergency department for increased generalized weakness and was diagnosed with the UTI and acute renal failure.  Active Problems:    Generalized muscle weakness    Acute cystitis without hematuria    Weakness secondary to possible UTI and renal failure    IV NS 125cc/hr    Patient has prior history of Klebsiella UTI which was sensitive to cephalosporins, will start him on Rocephin, received 1 dose of Zosyn from the emergency department.    Follow urine and blood cultures.     PT/OT evaluate patient and get social work consult.    Acute renal failure with tubular necrosis (H)    Renal failure multifactorial secondary to being on hydrochlorothiazide, reduced oral intake, UTI and possible hypotension    Continue IV hydration with normal saline      Benign essential hypertension    Paroxysmal atrial fibrillation (H)    Continue metoprolol and diltiazem, hold HCTZ    Pharmacy dosing warfarin, INR currently supratherapeutic (>5) will give small dose of oral vitamin K  BPH    Continue Flomax.    DVT Prophylaxis: Warfarin  Code Status: Full Code    Disposition: Expected discharge in 1-2 days.    Kain Lee MD  Text Page (7am to 6pm)    Interval History   H&P reviewed, patient seen.  He feels 'better.'  Denies pain, feels less tired.  No n/v, f/c.    -Data reviewed today: I reviewed all new labs and imaging results over the last 24 hours. I personally reviewed no images or EKG's today.    Physical Exam   Temp: 99.7  F (37.6  C) Temp src: Oral BP: 126/55   Heart Rate: 105 Resp: 16 SpO2: 94 % O2 Device: None (Room air)    Vitals:    07/22/18 1813   Weight: 82.6 kg (182 lb)     Vital Signs with Ranges  Temp:  [98.1  F (36.7  C)-99.7  F (37.6  C)] 99.7  F (37.6  C)  Heart Rate:  [] 105  Resp:  [15-61] 16  BP:  ()/(55-74) 126/55  SpO2:  [94 %-99 %] 94 %  I/O last 3 completed shifts:  In: 2000 [IV Piggyback:2000]  Out: 350 [Urine:350]    Constitutional: Awake, alert, cooperative, no apparent distress  Respiratory: Clear to auscultation bilaterally,   Cardiovascular: irr/irr, s1s2  GI:  soft, non-distended, non-tender  Skin/Integumen: No rashes, no cyanosis, no edema  Other:      Medications     - MEDICATION INSTRUCTIONS -       sodium chloride 75 mL/hr at 07/23/18 0109     Warfarin Therapy Reminder         cefTRIAXone  1 g Intravenous Q24H     diltiazem  120 mg Oral Daily     docusate sodium  100 mg Oral BID     famotidine  20 mg Oral Daily     metoprolol tartrate (LOPRESSOR) tablet 75 mg  75 mg Oral BID     multivitamin, therapeutic  1 tablet Oral Daily     phytonadione  2 mg Oral Once     sodium chloride (PF)  3 mL Intracatheter Q8H     tamsulosin  0.4 mg Oral At Bedtime       Data     Recent Labs  Lab 07/22/18  1948   WBC 20.3*   HGB 13.9   MCV 90      INR 5.20*      POTASSIUM 3.9   CHLORIDE 96   CO2 26   BUN 31*   CR 1.53*   ANIONGAP 11   OSWALD 8.8   *   ALBUMIN 2.6*   PROTTOTAL 7.7   BILITOTAL 1.1   ALKPHOS 79   ALT 15   AST 14   TROPI <0.015       Imaging:   Recent Results (from the past 24 hour(s))   XR Chest 2 Views    Narrative    CHEST TWO VIEWS  7/22/2018 8:37 PM     HISTORY: Chest pain. Shortness of breath.    COMPARISON: 2/23/2011.      Impression    IMPRESSION: No active disease     JOSEMANUEL BUCHANAN MD

## 2018-07-23 NOTE — CONSULTS
Care Transition Initial Assessment - SW  Reason For Consult: discharge planning  Met with: chart review    Active Problems:    Generalized muscle weakness    Acute cystitis without hematuria    Acute renal failure with tubular necrosis (H)    Benign essential hypertension    Paroxysmal atrial fibrillation (H)    Acute renal failure (ARF) (H)       DATA  Lives With: alone  Living Arrangements: apartment  Description of Support System: Supportive, Involved  Who is your support system?: Children  Support Assessment: Adequate family and caregiver support.   Identified issues/concerns regarding health management: SW following for dc needs  Quality Of Family Relationships: supportive, involved     ASSESSMENT  Cognitive Status: Alert and oriented X4  Concerns to be addressed: Patient is a 89 year old male who was admitted to the hospital for generalized muscle weakness. Prior to hospitalization [facility]. Patient will be assessed by PT/OT to determine safe discharge planning needs. SW will continue to follow and assist as needed.     PLAN  Financial costs for the patient includes   Patient given options and choices for discharge   Patient/family is agreeable to the plan?    Patient Goals and Preferences: TBD  Patient anticipates discharging to:  TBD      REDD Porras   *[phone]

## 2018-07-23 NOTE — PLAN OF CARE
Problem: Patient Care Overview  Goal: Plan of Care/Patient Progress Review  PT: Orders received, chart reviewed. Noted critical INR of 5.91. Per discussion with RN, will hold PT evaluation at this time.

## 2018-07-23 NOTE — PHARMACY-ANTICOAGULATION SERVICE
Clinical Pharmacy - Warfarin Dosing Consult     Pharmacy has been consulted to manage this patient s warfarin therapy.  Indication: Atrial Fibrillation  Therapy Goal: INR 2-3  Warfarin Prior to Admission: Yes  Warfarin PTA Regimen:  5mg mondays and friday, 2.5mg rest of the days of the week    INR   Date Value Ref Range Status   07/22/2018 5.20 (HH) 0.86 - 1.14 Final     Comment:     Critical Value called to and read back by  HAZEL GIFFORD IN 88 AT 0047 BY ALK     01/18/2016 2.61 (H) 0.86 - 1.14 Final       Recommend no warfarin tonight.  Pharmacy will monitor Nitin Brooek daily and order warfarin doses to achieve specified goal.    Please contact pharmacy as soon as possible if the warfarin needs to be held for a procedure or if the warfarin goals change.

## 2018-07-23 NOTE — PLAN OF CARE
Problem: Patient Care Overview  Goal: Plan of Care/Patient Progress Review  Outcome: No Change  A&Ox4. Tmax 100.3, other VSS on RA. [place]. Up with A1, walker and gait belt. Urinal at bedside d/t frequent voiding. Calls appropriately. Tolerating heart healthy diet. Lung sounds diminished. IV infusing. Denies pain. Continue to monitor.

## 2018-07-24 ENCOUNTER — APPOINTMENT (OUTPATIENT)
Dept: PHYSICAL THERAPY | Facility: CLINIC | Age: 83
DRG: 689 | End: 2018-07-24
Attending: INTERNAL MEDICINE
Payer: MEDICARE

## 2018-07-24 LAB
ANION GAP SERPL CALCULATED.3IONS-SCNC: 7 MMOL/L (ref 3–14)
BACTERIA SPEC CULT: NORMAL
BUN SERPL-MCNC: 22 MG/DL (ref 7–30)
CALCIUM SERPL-MCNC: 8.3 MG/DL (ref 8.5–10.1)
CHLORIDE SERPL-SCNC: 103 MMOL/L (ref 94–109)
CO2 SERPL-SCNC: 29 MMOL/L (ref 20–32)
CREAT SERPL-MCNC: 1.16 MG/DL (ref 0.66–1.25)
ERYTHROCYTE [DISTWIDTH] IN BLOOD BY AUTOMATED COUNT: 13.9 % (ref 10–15)
GFR SERPL CREATININE-BSD FRML MDRD: 59 ML/MIN/1.7M2
GLUCOSE SERPL-MCNC: 111 MG/DL (ref 70–99)
HCT VFR BLD AUTO: 35.7 % (ref 40–53)
HGB BLD-MCNC: 11.9 G/DL (ref 13.3–17.7)
INR PPP: 1.99 (ref 0.86–1.14)
Lab: NORMAL
MCH RBC QN AUTO: 30.1 PG (ref 26.5–33)
MCHC RBC AUTO-ENTMCNC: 33.3 G/DL (ref 31.5–36.5)
MCV RBC AUTO: 90 FL (ref 78–100)
PLATELET # BLD AUTO: 209 10E9/L (ref 150–450)
POTASSIUM SERPL-SCNC: 3.5 MMOL/L (ref 3.4–5.3)
RBC # BLD AUTO: 3.95 10E12/L (ref 4.4–5.9)
SODIUM SERPL-SCNC: 139 MMOL/L (ref 133–144)
SPECIMEN SOURCE: NORMAL
WBC # BLD AUTO: 11.3 10E9/L (ref 4–11)

## 2018-07-24 PROCEDURE — 40000193 ZZH STATISTIC PT WARD VISIT: Performed by: PHYSICAL THERAPIST

## 2018-07-24 PROCEDURE — 99232 SBSQ HOSP IP/OBS MODERATE 35: CPT | Performed by: INTERNAL MEDICINE

## 2018-07-24 PROCEDURE — 97161 PT EVAL LOW COMPLEX 20 MIN: CPT | Mod: GP | Performed by: PHYSICAL THERAPIST

## 2018-07-24 PROCEDURE — 80048 BASIC METABOLIC PNL TOTAL CA: CPT | Performed by: INTERNAL MEDICINE

## 2018-07-24 PROCEDURE — 36415 COLL VENOUS BLD VENIPUNCTURE: CPT | Performed by: INTERNAL MEDICINE

## 2018-07-24 PROCEDURE — 12000000 ZZH R&B MED SURG/OB

## 2018-07-24 PROCEDURE — A9270 NON-COVERED ITEM OR SERVICE: HCPCS | Mod: GY | Performed by: INTERNAL MEDICINE

## 2018-07-24 PROCEDURE — 25000128 H RX IP 250 OP 636: Performed by: INTERNAL MEDICINE

## 2018-07-24 PROCEDURE — 85610 PROTHROMBIN TIME: CPT | Performed by: INTERNAL MEDICINE

## 2018-07-24 PROCEDURE — 85027 COMPLETE CBC AUTOMATED: CPT | Performed by: INTERNAL MEDICINE

## 2018-07-24 PROCEDURE — 25000132 ZZH RX MED GY IP 250 OP 250 PS 637: Mod: GY | Performed by: INTERNAL MEDICINE

## 2018-07-24 RX ORDER — WARFARIN SODIUM 2.5 MG/1
2.5 TABLET ORAL
Status: COMPLETED | OUTPATIENT
Start: 2018-07-24 | End: 2018-07-24

## 2018-07-24 RX ADMIN — DILTIAZEM HYDROCHLORIDE 120 MG: 120 CAPSULE, EXTENDED RELEASE ORAL at 08:14

## 2018-07-24 RX ADMIN — METOPROLOL TARTRATE 75 MG: 50 TABLET ORAL at 21:28

## 2018-07-24 RX ADMIN — CEFTRIAXONE SODIUM 1 G: 1 INJECTION, POWDER, FOR SOLUTION INTRAMUSCULAR; INTRAVENOUS at 00:57

## 2018-07-24 RX ADMIN — METOPROLOL TARTRATE 75 MG: 50 TABLET ORAL at 08:14

## 2018-07-24 RX ADMIN — FAMOTIDINE 20 MG: 20 TABLET, FILM COATED ORAL at 08:14

## 2018-07-24 RX ADMIN — TAMSULOSIN HYDROCHLORIDE 0.4 MG: 0.4 CAPSULE ORAL at 21:28

## 2018-07-24 RX ADMIN — THERA TABS 1 TABLET: TAB at 08:14

## 2018-07-24 RX ADMIN — SODIUM CHLORIDE: 9 INJECTION, SOLUTION INTRAVENOUS at 00:57

## 2018-07-24 RX ADMIN — WARFARIN SODIUM 2.5 MG: 2.5 TABLET ORAL at 17:06

## 2018-07-24 ASSESSMENT — ACTIVITIES OF DAILY LIVING (ADL)
ADLS_ACUITY_SCORE: 13
ADLS_ACUITY_SCORE: 12
ADLS_ACUITY_SCORE: 13
ADLS_ACUITY_SCORE: 12

## 2018-07-24 NOTE — PLAN OF CARE
Problem: Patient Care Overview  Goal: Plan of Care/Patient Progress Review  Discharge Planner OT   Patient plan for discharge: Unknown.  Current status: Order rec'd and chart reviewed. Patient has been evaluated by PT who reports patient is at his baseline. Patient has no reported concerns for ADL.   Barriers to return to prior living situation: Defer to PT.  Recommendations for discharge: Defer to PT.   Rationale for recommendations: OT eval not indicated as patient has returned to baseline. PT reports he had no difficulty with ADL in bathroom. OT order completed.       Entered by: Tammie Muller 07/24/2018 11:03 AM

## 2018-07-24 NOTE — PLAN OF CARE
Problem: Patient Care Overview  Goal: Plan of Care/Patient Progress Review  Outcome: No Change  A&O, VSS on RA, denies pain. Pleasant and cooperative. Ambulating well in room w/SBA to bathroom; utilizing cane for mobility. Urine appearance extremely cloudy/concentrated (looks like orange juice). Tolerating cardiac/no caffeine diet. PT/OT following. IVF infusing.

## 2018-07-24 NOTE — PLAN OF CARE
Problem: Patient Care Overview  Goal: Plan of Care/Patient Progress Review  Outcome: No Change  A&Ox4. VSS on RA. [place]. Up with SB, cane and gait belt to bathroom. Calls appropriately. Tolerating heart healthy diet, no caffeine. Lung sounds diminished. IV SL. Denies pain. INR improved. Plan for discharge home tomorrow. Continue to monitor.

## 2018-07-24 NOTE — PLAN OF CARE
Problem: Patient Care Overview  Goal: Plan of Care/Patient Progress Review  Outcome: No Change  VSS, LS clear.  A/o, up Ax1.  Voids frequently, adequate amts.  Denies pain.

## 2018-07-24 NOTE — PROGRESS NOTES
07/24/18 0907   Quick Adds   Type of Visit Initial PT Evaluation   Living Environment   Lives With alone   Living Arrangements apartment;independent living facility   Home Accessibility no concerns   Number of Stairs to Enter Home 0   Number of Stairs Within Home 0   Transportation Available car;family or friend will provide  (patient drives at baseline)   Living Environment Comment no stairs; able to navigate independently   Self-Care   Usual Activity Tolerance excellent   Current Activity Tolerance good   Regular Exercise yes   Activity/Exercise Type walking   Exercise Amount/Frequency 3-5 times/wk   Equipment Currently Used at Home cane, straight  (Hurry cane)   Activity/Exercise/Self-Care Comment patient normally independent and active with a cane   Functional Level Prior   Ambulation 1-->assistive equipment  (cane)   Transferring 1-->assistive equipment  (cane)   Toileting 0-->independent   Bathing 0-->independent   Dressing 0-->independent   Eating 0-->independent   Communication 0-->understands/communicates without difficulty   Swallowing 0-->swallows foods/liquids without difficulty   Cognition 0 - no cognition issues reported   Fall history within last six months no   Which of the above functional risks had a recent onset or change? ambulation  (weakness)   Prior Functional Level Comment patient independent and active at baseline   General Information   Onset of Illness/Injury or Date of Surgery - Date 07/22/18   Referring Physician Cookie Funes MD   Patient/Family Goals Statement return home   Pertinent History of Current Problem (include personal factors and/or comorbidities that impact the POC) Nitin Brooke is a 89 year old male with history of hypertension on hydrochlorothiazide, A. fib  on warfarin admitted to emergency department for increased generalized weakness and was diagnosed with the UTI and acute renal failure.   Precautions/Limitations fall precautions   General Observations patient  "needing to use bathroom upon arrival; [facility]   General Info Comments Activity: up with assist   Cognitive Status Examination   Orientation orientation to person, place and time   Level of Consciousness alert   Follows Commands and Answers Questions 100% of the time   Personal Safety and Judgment intact   Memory intact   Pain Assessment   Patient Currently in Pain Yes, see Vital Sign flowsheet   Posture    Posture Comments WFL   Range of Motion (ROM)   ROM Comment WFL   Strength   Strength Comments WFL   Bed Mobility   Bed Mobility Comments independent   Transfer Skills   Transfer Comments sit<>stand and toilet transfer with modified independent; no LOB   Gait   Gait Comments able to ambulate to bathroom and > 150 feet with cane and modified independence; no LOB   Balance   Balance Comments intact; use of cane   Sensory Examination   Sensory Perception Comments intact   General Therapy Interventions   Planned Therapy Interventions other (see comments)  (encourage walking program with nursing and prior HEP)   Clinical Impression   Criteria for Skilled Therapeutic Intervention no;evaluation only;current level of function same as previous level of function   Therapy Frequency` other (see comments)  (1x eval)   Predicted Duration of Therapy Intervention (days/wks) 1x eval only   Anticipated Equipment Needs at Discharge standard cane  (Hurry cane)   Anticipated Discharge Disposition Home;Home with Assist   Risk & Benefits of therapy have been explained Yes   Patient, Family & other staff in agreement with plan of care Yes   [facility] [employer] TM \"6 Clicks\"   2016, Trustees of [facility], under license to [employer].  All rights reserved.   6 Clicks Short Forms Basic Mobility Inpatient Short Form   [facility] AM-PAC  \"6 Clicks\" V.2 Basic Mobility Inpatient Short Form   1. Turning from your back to your side while in a flat bed without using bedrails? 3 - A Little   2. Moving from lying on your back to " sitting on the side of a flat bed without using bedrails? 4 - None   3. Moving to and from a bed to a chair (including a wheelchair)? 4 - None   4. Standing up from a chair using your arms (e.g., wheelchair, or bedside chair)? 4 - None   5. To walk in hospital room? 4 - None   6. Climbing 3-5 steps with a railing? 4 - None   Basic Mobility Raw Score (Score out of 24.Lower scores equate to lower levels of function) 23   Total Evaluation Time   Total Evaluation Time (Minutes) 15

## 2018-07-24 NOTE — PROGRESS NOTES
[facility]    Hospitalist Progress Note    Assessment & Plan   Nitin Brooke is a 89 year old male with history of hypertension on hydrochlorothiazide, A. fib  on warfarin admitted to emergency department for increased generalized weakness and was diagnosed with the UTI and acute renal failure.  Active Problems:    Generalized muscle weakness    Acute cystitis without hematuria    Weakness secondary to possible UTI and renal failure    Stop saline    Patient has prior history of Klebsiella UTI which was sensitive to cephalosporins, on rocephin    Blood cultures negative to date. Urine cx shows urogenital rosa maria     PT/OT evaluate patient and get social work consult.    Acute renal failure with tubular necrosis (H)    Resolved, ok to stop saline      Benign essential hypertension    Paroxysmal atrial fibrillation (H)    Continue metoprolol and diltiazem, hold HCTZ    INR supratherapeutic on admit, gave oral vitamin K 7/23, INR now in therapeutic range, pharmacy dosing warfarin  BPH    Continue Flomax.    DVT Prophylaxis: Warfarin  Code Status: Full Code    Disposition: PT feels he is safe for home, plan for home tomorrow.    Kain Lee MD  Text Page (7am to 6pm)    Interval History   Doing MUCH better today.  More energy, ambulating.  Denies pain, n/v, f/c.    -Data reviewed today: I reviewed all new labs and imaging results over the last 24 hours. I personally reviewed no images or EKG's today.    Physical Exam   Temp: 96.8  F (36  C) Temp src: Oral BP: 116/57 Pulse: 79 Heart Rate: 85 Resp: 18 SpO2: 98 % O2 Device: None (Room air)    Vitals:    07/22/18 1813   Weight: 82.6 kg (182 lb)     Vital Signs with Ranges  Temp:  [96.8  F (36  C)-100.3  F (37.9  C)] 96.8  F (36  C)  Pulse:  [79] 79  Heart Rate:  [9-85] 85  Resp:  [17-20] 18  BP: (102-122)/(49-70) 116/57  SpO2:  [92 %-98 %] 98 %  I/O last 3 completed shifts:  In: 2382 [P.O.:470; I.V.:1912]  Out: 775 [Urine:775]    Constitutional: Awake,  alert, cooperative, no apparent distress  Respiratory: Clear to auscultation bilaterally,   Cardiovascular: irr/irr, s1s2  GI:  soft, non-distended, non-tender  Skin/Integumen: No rashes, no cyanosis, no edema  Other:      Medications     - MEDICATION INSTRUCTIONS -       sodium chloride 75 mL/hr at 07/24/18 0818     Warfarin Therapy Reminder         cefTRIAXone  1 g Intravenous Q24H     diltiazem  120 mg Oral Daily     docusate sodium  100 mg Oral BID     famotidine  20 mg Oral Daily     metoprolol tartrate (LOPRESSOR) tablet 75 mg  75 mg Oral BID     multivitamin, therapeutic  1 tablet Oral Daily     sodium chloride (PF)  3 mL Intracatheter Q8H     tamsulosin  0.4 mg Oral At Bedtime     warfarin  2.5 mg Oral ONCE at 18:00       Data     Recent Labs  Lab 07/24/18  0640 07/23/18  0750 07/22/18  1948   WBC 11.3*  --  20.3*   HGB 11.9*  --  13.9   MCV 90  --  90     --  270   INR 1.99* 5.91* 5.20*     --  133   POTASSIUM 3.5  --  3.9   CHLORIDE 103  --  96   CO2 29  --  26   BUN 22  --  31*   CR 1.16  --  1.53*   ANIONGAP 7  --  11   OSWALD 8.3*  --  8.8   *  --  221*   ALBUMIN  --   --  2.6*   PROTTOTAL  --   --  7.7   BILITOTAL  --   --  1.1   ALKPHOS  --   --  79   ALT  --   --  15   AST  --   --  14   TROPI  --   --  <0.015       Imaging:   No results found for this or any previous visit (from the past 24 hour(s)).

## 2018-07-24 NOTE — PLAN OF CARE
Problem: Patient Care Overview  Goal: Plan of Care/Patient Progress Review  Discharge Planner PT   Patient plan for discharge: return home  Current status: Order received; 1x evaluation completed for disposition planning; Patient admitted with weakness and found to have a UTI and ARF; Prior to admit patient was very active at his independent living apt, driving, and doing all of his own cares and assisting others. Patient currently reports feeling nearly at his baseline; Patient noted to be oriented x 4 and was modified independent with all functional mobility using a cane for gait/transfers; able to ambulate > 150 feet without LOB and with good tolerance; able to do own toileting without assist; no SOB or reports of increased weakness; Patient does a home ex program including strengthening and walking. Recommend patient continue doing a walking program with nursing while hospitalized to maximize return to PLOF and prevent decline of function/strength. No further skilled PT indicated at this time. Nurse made aware  Barriers to return to prior living situation: None noted  Recommendations for discharge: return home with assist from family/friends as needed  Rationale for recommendations: Patient at or near baseline level of function; encouraged patient to continue walking and doing prior ex program       Entered by: Kirsten Duran 07/24/2018 9:21 AM

## 2018-07-25 VITALS
BODY MASS INDEX: 27.58 KG/M2 | TEMPERATURE: 98.6 F | RESPIRATION RATE: 16 BRPM | HEIGHT: 68 IN | SYSTOLIC BLOOD PRESSURE: 115 MMHG | DIASTOLIC BLOOD PRESSURE: 62 MMHG | HEART RATE: 77 BPM | WEIGHT: 182 LBS | OXYGEN SATURATION: 98 %

## 2018-07-25 LAB
ANION GAP SERPL CALCULATED.3IONS-SCNC: 7 MMOL/L (ref 3–14)
BUN SERPL-MCNC: 23 MG/DL (ref 7–30)
CALCIUM SERPL-MCNC: 8.4 MG/DL (ref 8.5–10.1)
CHLORIDE SERPL-SCNC: 103 MMOL/L (ref 94–109)
CO2 SERPL-SCNC: 29 MMOL/L (ref 20–32)
CREAT SERPL-MCNC: 1.05 MG/DL (ref 0.66–1.25)
ERYTHROCYTE [DISTWIDTH] IN BLOOD BY AUTOMATED COUNT: 14 % (ref 10–15)
GFR SERPL CREATININE-BSD FRML MDRD: 66 ML/MIN/1.7M2
GLUCOSE SERPL-MCNC: 109 MG/DL (ref 70–99)
HCT VFR BLD AUTO: 34 % (ref 40–53)
HGB BLD-MCNC: 11.3 G/DL (ref 13.3–17.7)
INR PPP: 1.49 (ref 0.86–1.14)
MCH RBC QN AUTO: 30.1 PG (ref 26.5–33)
MCHC RBC AUTO-ENTMCNC: 33.2 G/DL (ref 31.5–36.5)
MCV RBC AUTO: 91 FL (ref 78–100)
PLATELET # BLD AUTO: 228 10E9/L (ref 150–450)
POTASSIUM SERPL-SCNC: 3.3 MMOL/L (ref 3.4–5.3)
POTASSIUM SERPL-SCNC: 4.1 MMOL/L (ref 3.4–5.3)
RBC # BLD AUTO: 3.75 10E12/L (ref 4.4–5.9)
SODIUM SERPL-SCNC: 139 MMOL/L (ref 133–144)
WBC # BLD AUTO: 9.2 10E9/L (ref 4–11)

## 2018-07-25 PROCEDURE — 85027 COMPLETE CBC AUTOMATED: CPT | Performed by: INTERNAL MEDICINE

## 2018-07-25 PROCEDURE — 80048 BASIC METABOLIC PNL TOTAL CA: CPT | Performed by: INTERNAL MEDICINE

## 2018-07-25 PROCEDURE — 84132 ASSAY OF SERUM POTASSIUM: CPT | Performed by: INTERNAL MEDICINE

## 2018-07-25 PROCEDURE — A9270 NON-COVERED ITEM OR SERVICE: HCPCS | Mod: GY | Performed by: INTERNAL MEDICINE

## 2018-07-25 PROCEDURE — 36415 COLL VENOUS BLD VENIPUNCTURE: CPT | Performed by: INTERNAL MEDICINE

## 2018-07-25 PROCEDURE — 25000132 ZZH RX MED GY IP 250 OP 250 PS 637: Mod: GY | Performed by: INTERNAL MEDICINE

## 2018-07-25 PROCEDURE — 25000128 H RX IP 250 OP 636: Performed by: INTERNAL MEDICINE

## 2018-07-25 PROCEDURE — 99238 HOSP IP/OBS DSCHRG MGMT 30/<: CPT | Performed by: INTERNAL MEDICINE

## 2018-07-25 PROCEDURE — 85610 PROTHROMBIN TIME: CPT | Performed by: INTERNAL MEDICINE

## 2018-07-25 RX ORDER — POTASSIUM CHLORIDE 1500 MG/1
20-40 TABLET, EXTENDED RELEASE ORAL
Status: DISCONTINUED | OUTPATIENT
Start: 2018-07-25 | End: 2018-07-25 | Stop reason: HOSPADM

## 2018-07-25 RX ORDER — POTASSIUM CHLORIDE 1.5 G/1.58G
20-40 POWDER, FOR SOLUTION ORAL
Status: DISCONTINUED | OUTPATIENT
Start: 2018-07-25 | End: 2018-07-25 | Stop reason: HOSPADM

## 2018-07-25 RX ORDER — CEFUROXIME AXETIL 250 MG/1
250 TABLET ORAL 2 TIMES DAILY
Qty: 10 TABLET | Refills: 0 | Status: SHIPPED | OUTPATIENT
Start: 2018-07-25 | End: 2018-07-30

## 2018-07-25 RX ORDER — WARFARIN SODIUM 2 MG/1
4 TABLET ORAL
Status: DISCONTINUED | OUTPATIENT
Start: 2018-07-25 | End: 2018-07-25 | Stop reason: HOSPADM

## 2018-07-25 RX ORDER — POTASSIUM CL/LIDO/0.9 % NACL 10MEQ/0.1L
10 INTRAVENOUS SOLUTION, PIGGYBACK (ML) INTRAVENOUS
Status: DISCONTINUED | OUTPATIENT
Start: 2018-07-25 | End: 2018-07-25 | Stop reason: HOSPADM

## 2018-07-25 RX ORDER — POTASSIUM CHLORIDE 29.8 MG/ML
20 INJECTION INTRAVENOUS
Status: DISCONTINUED | OUTPATIENT
Start: 2018-07-25 | End: 2018-07-25 | Stop reason: HOSPADM

## 2018-07-25 RX ORDER — POTASSIUM CHLORIDE 7.45 MG/ML
10 INJECTION INTRAVENOUS
Status: DISCONTINUED | OUTPATIENT
Start: 2018-07-25 | End: 2018-07-25 | Stop reason: HOSPADM

## 2018-07-25 RX ADMIN — POTASSIUM CHLORIDE 20 MEQ: 1500 TABLET, EXTENDED RELEASE ORAL at 10:14

## 2018-07-25 RX ADMIN — POTASSIUM CHLORIDE 40 MEQ: 1500 TABLET, EXTENDED RELEASE ORAL at 08:11

## 2018-07-25 RX ADMIN — THERA TABS 1 TABLET: TAB at 08:11

## 2018-07-25 RX ADMIN — CEFTRIAXONE SODIUM 1 G: 1 INJECTION, POWDER, FOR SOLUTION INTRAMUSCULAR; INTRAVENOUS at 01:11

## 2018-07-25 RX ADMIN — METOPROLOL TARTRATE 75 MG: 50 TABLET ORAL at 08:11

## 2018-07-25 RX ADMIN — DILTIAZEM HYDROCHLORIDE 120 MG: 120 CAPSULE, EXTENDED RELEASE ORAL at 08:11

## 2018-07-25 RX ADMIN — FAMOTIDINE 20 MG: 20 TABLET, FILM COATED ORAL at 08:11

## 2018-07-25 ASSESSMENT — ACTIVITIES OF DAILY LIVING (ADL)
ADLS_ACUITY_SCORE: 12
ADLS_ACUITY_SCORE: 13

## 2018-07-25 NOTE — PLAN OF CARE
Problem: Patient Care Overview  Goal: Plan of Care/Patient Progress Review  Outcome: Adequate for Discharge Date Met: 07/25/18    Patient discharged at 3:50 PM to home.  IV was discontinued on day shift.  Belongings returned to patient.  Discharge instructions and medications reviewed with patient on day shift.  Patient verbalized understanding and all questions were answered.  Prescriptions given to patient.  At time of discharge, patient condition was stable and left the unit via wheelchair escorted by son and nursing assistant.

## 2018-07-25 NOTE — PROGRESS NOTES
Met with the patient at the bedside regarding discharge planning.  Patient is discharging today and requires INR draw by Friday 7/27.  Patient agreeable to have writer schedule this draw at [facility].  Patient identifies no further needs for discharge at this time.   -Appointment in [facility] INR 7/27 1:15 p.m.

## 2018-07-25 NOTE — DISCHARGE SUMMARY
[facility]    Discharge Summary  Hospitalist    Date of Admission:  7/22/2018  Date of Discharge:  7/25/2018  Discharging Provider: Daniel Smith  Date of Service (when I saw the patient): 07/25/18      History of Present Illness  From admission H&P  Nitin Brooke is a 89 year old male with a history of A. fib on warfarin, hypertension on hydrochlorothiazide presents to the emergency department with two-week history of increased progressive weakness.  He had left-sided flank pain 10 days ago and he mentions that his urine was dark and then it got cleared.  He did not receive any antibiotics during this time.  Over past 1 week his weakness had progressively gotten worse and that it was difficult for him to be at home alone.  Patient is a poor historian due to his significant difficulty hearing, has bilateral hearing loss.  He felt that he was dehydrated a few days ago and he was trying to hydrate himself, denied any fever chills or abdominal pain.  He had dysuria for a short while but denies any that now.     In the emergency department he was found to have a creatinine of 1.53, his baseline is 1.  The elevated white count.  LFTs and lactate are within normal limits.  His UA was significantly abnormal cultures pending.  Had a normal chest x-ray.    Hospital Course   Nitin Brooke was admitted on 7/22/2018.  The following problems were addressed during his hospitalization:    UTI: has a history of klebsiella sensitive to cephalosporins.  UA here with many WBC and bacteria, started on IVF and ceftriaxone with resolution of symptoms.  Urine culture showed only mixed rosa maria.  Will plan to finish a course of antibiotics with ceftin at discharge.   -ceftin 250 mg BID x 5 more days    Acute kidney injury on CKD stage 2: due to poor po intake from the UTI as well as use of hydrochlorothiazide.  Resolved to baseline creatinine of 1.0 with IVF.   -will not restart hydrochlorothiazide at discharge as  blood pressure not requiring this    Hypertension: well controlled currently on metoprolol and diltiazem.  Hasn't needed hydrochlorothiazide which was held due to MONIQUE.   -continue on metoprolol and diltiazem only at discharge  -discontinue hydrochlorothiazide    Atrial fibrillation: controlled on metoprolol and diltiazem.  Coumadin has been restated at PTA dose.  He was supratherapeutic on admission but had poor po intake which was likely contributing as per Care Everywhere he has had therapeutic INRs in the recent past.  He did get oral vitamin K for an INR of 5 on admission.  I expect that his INR will return to the normal range on his PTA regimen.  He will follow up with his PCP in 2-3 days for INR check    BPH: flomax    Mild hypokalemia: noted on the day of discharge, likely due to use of hydrochlorothiazide PTA.  Replaced per protocol prior to discharge.      Discharge Pain Plan:   - Patient currently has NO PAIN and is not being prescribed pain medications on discharge.    Daniel Smith        Pending Results   These results will be followed up by PCP  Unresulted Labs Ordered in the Past 30 Days of this Admission     Date and Time Order Name Status Description    7/23/2018 0040 Blood culture Preliminary     7/23/2018 0040 Blood culture Preliminary     7/22/2018 2222 Blood culture Preliminary     7/22/2018 2222 Blood culture Preliminary           Code Status   Full Code       Primary Care Physician   Shaun Shepherd    General: sitting in bed, appears comfortable  Cardiovascular: RRR  Pulmonary: CTAB  GI: +BS, soft  Lymphatics: no edema  Skin: no rash    Discharge Disposition   Discharged to home  Condition at discharge: Stable    Consultations This Hospital Stay   PHARMACY TO DOSE WARFARIN  SOCIAL WORK IP CONSULT  PHYSICAL THERAPY ADULT IP CONSULT  OCCUPATIONAL THERAPY ADULT IP CONSULT    Time Spent on this Encounter   I, Daniel Smith, personally saw the patient today and spent less than or  equal to 30 minutes discharging this patient.    Discharge Orders     Reason for your hospital stay   You were admitted with dehydration and likely urinary tract infection.  You improved with IV fluids and antibiotics.     Activity   Your activity upon discharge: activity as tolerated     Follow-up and recommended labs and tests    Follow up with PCP for INR check in 2-3 days     Full Code     Diet   Follow this diet upon discharge: Orders Placed This Encounter     Combination Diet Low Saturated Fat Na <2400mg Diet, No Caffeine Diet       Discharge Medications   Current Discharge Medication List      START taking these medications    Details   cefuroxime (CEFTIN) 250 MG tablet Take 1 tablet (250 mg) by mouth 2 times daily for 5 days  Qty: 10 tablet, Refills: 0    Associated Diagnoses: Urinary tract infection with hematuria, site unspecified         CONTINUE these medications which have NOT CHANGED    Details   ACETAMINOPHEN PO Take 500 mg by mouth daily as needed for pain       diltiazem (TIAZAC) 120 MG CP24 Take 120 mg by mouth daily      diphenhydrAMINE-acetaminophen (TYLENOL PM)  MG tablet Take 1 tablet by mouth At Bedtime      lidocaine (LMX4) 4 % CREA cream Apply topically daily as needed for mild pain      METOPROLOL TARTRATE PO Take 75 mg by mouth 2 times daily Patient takes 1.5 of 50mg tablet twice daily.      multivitamin, therapeutic (THERA-VIT) TABS Take 1 tablet by mouth daily       tamsulosin (FLOMAX) 0.4 MG capsule Take 0.4 mg by mouth At Bedtime      !! WARFARIN SODIUM PO Take 5 mg by mouth twice a week Patient takes 5mg on M/F and 2.5mg on the rest of the days of the week      !! WARFARIN SODIUM PO Take 2.5 mg by mouth five times a week Patient takes 2.5mg on Tu/Wed/Th/Sa/Sun and 5mg on M/F.       !! - Potential duplicate medications found. Please discuss with provider.      STOP taking these medications       HYDROCHLOROTHIAZIDE PO Comments:   Reason for Stopping:             Allergies   No  Known Allergies  Data   Most Recent 3 CBC's:  Recent Labs   Lab Test  07/25/18   0648  07/24/18   0640  07/22/18 1948   WBC  9.2  11.3*  20.3*   HGB  11.3*  11.9*  13.9   MCV  91  90  90   PLT  228  209  270      Most Recent 3 BMP's:  Recent Labs   Lab Test  07/25/18   0648  07/24/18   0640  07/22/18 1948   NA  139  139  133   POTASSIUM  3.3*  3.5  3.9   CHLORIDE  103  103  96   CO2  29  29  26   BUN  23  22  31*   CR  1.05  1.16  1.53*   ANIONGAP  7  7  11   OSWALD  8.4*  8.3*  8.8   GLC  109*  111*  221*     Most Recent 2 LFT's:  Recent Labs   Lab Test  07/22/18 1948   AST  14   ALT  15   ALKPHOS  79   BILITOTAL  1.1     Most Recent INR's and Anticoagulation Dosing History:  Anticoagulation Dose History     Recent Dosing and Labs Latest Ref Rng & Units 1/11/2014 1/12/2014 1/18/2016 7/22/2018 7/23/2018 7/24/2018 7/25/2018    Warfarin 2.5 mg - 2.5 mg - - - - 2.5 mg -    INR 0.86 - 1.14 2.28(H) 2.26(H) 2.61(H) 5.20(HH) 5.91(HH) 1.99(H) 1.49(H)        Most Recent 3 Troponin's:  Recent Labs   Lab Test  07/22/18   1948 02/23/11   2305  02/23/11   1918   TROPI  <0.015  <0.012  <0.012     Most Recent Cholesterol Panel:No lab results found.  Most Recent 6 Bacteria Isolates From Any Culture (See EPIC Reports for Culture Details):  Recent Labs   Lab Test  07/23/18   0758  07/23/18   0750  07/22/18   2230  07/22/18   2142  07/22/18   1938  01/18/16   0900   CULT  No growth after 2 days  No growth after 2 days  No growth after 2 days  50,000 to 100,000 colonies/mL  mixed urogenital rosa maria  Susceptibility testing not routinely done    No growth after 2 days  >100,000 colonies/mL Klebsiella pneumoniae  <10,000 colonies/mL urogenital rosa maria Susceptibility testing not routinely done  *     Most Recent TSH, T4 and A1c Labs:  Recent Labs   Lab Test  07/23/18   0750   A1C  6.1*     Results for orders placed or performed during the hospital encounter of 07/22/18   XR Chest 2 Views    Narrative    CHEST TWO VIEWS  7/22/2018 8:37 PM      HISTORY: Chest pain. Shortness of breath.    COMPARISON: 2/23/2011.      Impression    IMPRESSION: No active disease     JOSEMANUEL BUCHANAN MD

## 2018-07-25 NOTE — PLAN OF CARE
Problem: Urinary Tract Infection (Adult)  Goal: Signs and Symptoms of Listed Potential Problems Will be Absent, Minimized or Managed (Urinary Tract Infection)  Signs and symptoms of listed potential problems will be absent, minimized or managed by discharge/transition of care (reference Urinary Tract Infection (Adult) CPG).   Outcome: Improving  A/Ox4. Afebrile overnight, VSS on RA. Denies pain. LS clear. Cardiac diet, no caffeine. BS+. Passing flatus. Multiple small BMs overnight. SBA with walker to bathroom. Occasional hesitancy with urinating. R PIV SL, intermittent IV cleocin. Plan for likely discharge home today. Continue to monitor.

## 2018-07-25 NOTE — PLAN OF CARE
Problem: Patient Care Overview  Goal: Plan of Care/Patient Progress Review  Outcome: No Change  A/Ox4. VSS, afebrile. Denies pain. Cardiac diet, no caffeine, good appetite. BS+, Passing flatus/stool. Occasional hesitancy with urinating. SBA with walker to bathroom, steady. PIV DCd. Plan: Discharge home with antibiotics this afternoon. Will follow-up for INR testing on Fri.

## 2018-07-25 NOTE — PLAN OF CARE
Problem: Patient Care Overview  Goal: Plan of Care/Patient Progress Review  Outcome: No Change  Temp to 100.2, OVSS on RA.  Denies pain.  Pt reports numbness in fingers.  Pt on heart healthy diet with no caffeine.   R PIV SL, WDL.  Pt has some hesitancy when urinating.  Urine is stephenie, malodorous, and has sediment. Pt up with SB, cane and gait belt.  Plan of care: continue to monitor.  Possible discharge tomorrow.

## 2018-07-29 LAB
BACTERIA SPEC CULT: NO GROWTH
Lab: NORMAL
SPECIMEN SOURCE: NORMAL